# Patient Record
Sex: FEMALE | Race: WHITE | HISPANIC OR LATINO | Employment: OTHER | URBAN - METROPOLITAN AREA
[De-identification: names, ages, dates, MRNs, and addresses within clinical notes are randomized per-mention and may not be internally consistent; named-entity substitution may affect disease eponyms.]

---

## 2022-11-20 ENCOUNTER — HOSPITAL ENCOUNTER (EMERGENCY)
Facility: CLINIC | Age: 57
Discharge: HOME | End: 2022-11-20
Attending: EMERGENCY MEDICINE | Admitting: EMERGENCY MEDICINE
Payer: COMMERCIAL

## 2022-11-20 VITALS
SYSTOLIC BLOOD PRESSURE: 134 MMHG | RESPIRATION RATE: 18 BRPM | HEART RATE: 75 BPM | HEIGHT: 58 IN | WEIGHT: 110 LBS | OXYGEN SATURATION: 97 % | DIASTOLIC BLOOD PRESSURE: 65 MMHG | BODY MASS INDEX: 23.09 KG/M2 | TEMPERATURE: 98.2 F

## 2022-11-20 DIAGNOSIS — R73.9 HYPERGLYCEMIA: ICD-10-CM

## 2022-11-20 LAB
ANION GAP SERPL CALCULATED.3IONS-SCNC: 13 MMOL/L (ref 5–18)
BUN SERPL-MCNC: 11 MG/DL (ref 8–22)
CALCIUM SERPL-MCNC: 8.7 MG/DL (ref 8.5–10.5)
CHLORIDE BLD-SCNC: 105 MMOL/L (ref 98–107)
CO2 SERPL-SCNC: 18 MMOL/L (ref 22–31)
CREAT SERPL-MCNC: 0.68 MG/DL (ref 0.6–1.1)
ERYTHROCYTE [DISTWIDTH] IN BLOOD BY AUTOMATED COUNT: 12.3 % (ref 10–15)
GFR SERPL CREATININE-BSD FRML MDRD: >90 ML/MIN/1.73M2
GLUCOSE BLD-MCNC: 263 MG/DL (ref 70–125)
GLUCOSE BLDC GLUCOMTR-MCNC: 251 MG/DL (ref 70–99)
HCT VFR BLD AUTO: 38.8 % (ref 35–47)
HGB BLD-MCNC: 13.7 G/DL (ref 11.7–15.7)
MCH RBC QN AUTO: 32.2 PG (ref 26.5–33)
MCHC RBC AUTO-ENTMCNC: 35.3 G/DL (ref 31.5–36.5)
MCV RBC AUTO: 91 FL (ref 78–100)
PLATELET # BLD AUTO: 262 10E3/UL (ref 150–450)
POTASSIUM BLD-SCNC: 4.4 MMOL/L (ref 3.5–5)
RBC # BLD AUTO: 4.25 10E6/UL (ref 3.8–5.2)
SODIUM SERPL-SCNC: 136 MMOL/L (ref 136–145)
WBC # BLD AUTO: 6.1 10E3/UL (ref 4–11)

## 2022-11-20 PROCEDURE — 258N000003 HC RX IP 258 OP 636: Performed by: EMERGENCY MEDICINE

## 2022-11-20 PROCEDURE — 96361 HYDRATE IV INFUSION ADD-ON: CPT

## 2022-11-20 PROCEDURE — 99283 EMERGENCY DEPT VISIT LOW MDM: CPT | Mod: 25

## 2022-11-20 PROCEDURE — 80048 BASIC METABOLIC PNL TOTAL CA: CPT | Performed by: EMERGENCY MEDICINE

## 2022-11-20 PROCEDURE — 85018 HEMOGLOBIN: CPT | Performed by: EMERGENCY MEDICINE

## 2022-11-20 PROCEDURE — 36415 COLL VENOUS BLD VENIPUNCTURE: CPT | Performed by: EMERGENCY MEDICINE

## 2022-11-20 PROCEDURE — 250N000013 HC RX MED GY IP 250 OP 250 PS 637: Performed by: EMERGENCY MEDICINE

## 2022-11-20 PROCEDURE — 96360 HYDRATION IV INFUSION INIT: CPT

## 2022-11-20 RX ORDER — INSULIN GLARGINE 100 [IU]/ML
40 INJECTION, SOLUTION SUBCUTANEOUS 2 TIMES DAILY
Qty: 16 ML | Refills: 0 | Status: SHIPPED | OUTPATIENT
Start: 2022-11-20 | End: 2022-12-10

## 2022-11-20 RX ORDER — MECLIZINE HYDROCHLORIDE 25 MG/1
25 TABLET ORAL ONCE
Status: COMPLETED | OUTPATIENT
Start: 2022-11-20 | End: 2022-11-20

## 2022-11-20 RX ADMIN — SODIUM CHLORIDE 1000 ML: 9 INJECTION, SOLUTION INTRAVENOUS at 08:17

## 2022-11-20 RX ADMIN — MECLIZINE HYDROCHLORIDE 25 MG: 25 TABLET ORAL at 08:18

## 2022-11-20 ASSESSMENT — ACTIVITIES OF DAILY LIVING (ADL): ADLS_ACUITY_SCORE: 35

## 2022-11-20 NOTE — ED TRIAGE NOTES
Patient presents to the ED with complaints of nausea and dizziness for the last week. She reports she is having trouble getting her insulin and is working with her primary and insurance company. She is here from out of town on vacation. She has not had insulin for the last week.

## 2022-11-20 NOTE — DISCHARGE INSTRUCTIONS
Take the prescribed Lantus as directed and follow-up closely with your primary care doctor.  Return to the emergency department for any worsening symptoms or other concerns.

## 2022-11-20 NOTE — ED PROVIDER NOTES
EMERGENCY DEPARTMENT ENCOUnter      NAME: Khadra Perez  AGE: 57 year old female  YOB: 1965  MRN: [id number]  EVALUATION DATE & TIME: 2022  7:53 AM    PCP: No primary care provider on file.    ED PROVIDER: Kervin Parker DO      Chief Complaint   Patient presents with     Dizziness         FINAL IMPRESSION:  1. Hyperglycemia          ED COURSE & MEDICAL DECISION MAKIN:05 AM I met with the patient to gather history and perform an initial exam. PPE: gloves, N95 mask  9:22 AM I called lab for update on BMP.   10:08 AM I rechecked on the patient and updated them on results. We discussed plans for discharge including supportive cares, symptomatic treatment, outpatient follow up, and reasons to return to the emergency department.      The patient presents to the emergency department this morning with complaints of dizziness which has been present since she has been out of her insulin.  She has continued to watch her blood sugars closely.  She denies any other focal complaints.  Laboratory testing has been unremarkable today aside from a mild hyperglycemia.  She is feeling better after IV fluids and meclizine.  Plan will be for discharge home with a refill of her home dose of her insulin and instructions for outpatient follow-up.  She is comfortable with this plan.      Medical Decision Making    History:    Supplemental history from: N/A    External Record(s) reviewed: Documented in HPI, if applicable.    Work Up:    Chart documentation includes differential considered and any EKGs or imaging interpreted by provider.    In additional to work up documented, I considered the following work up: See chart documentation, if applicable.    External consultation:    Discussion of management with another provider: See chart documentation, if applicable    Complicating factors:    Care impacted by chronic illness: Diabetes    Care affected by social determinants of health: N/A    Disposition  considerations: Discharge. I prescribed additional prescription strength medication(s) as charted. I considered admission, but discharged patient after reassuring labs and/or imaging.      At the conclusion of the encounter I discussed the results of all of the tests and the disposition. The questions were answered. The patient or family acknowledged understanding and was agreeable with the care plan.         MEDICATIONS GIVEN IN THE EMERGENCY:  Medications   0.9% sodium chloride BOLUS (1,000 mLs Intravenous New Bag 11/20/22 0817)   meclizine (ANTIVERT) tablet 25 mg (25 mg Oral Given 11/20/22 0818)       NEW PRESCRIPTIONS STARTED AT TODAY'S ER VISIT  New Prescriptions    No medications on file          =================================================================    HPI        Khadra Perez is a 57 year old female with a pertinent history of diabetes mellitus who presents to this ED via car accompanied by her sister for evaluation of dizziness and hyperglycemia. The patient has been without her insulin for about three days. During this time, she developed room-spinning dizziness with associated nausea. She states she is unable to balance herself well when ambulating. She is normally fairly active at baseline, but she has been more fatigued over the last few days. Her morning blood sugars have been primarily around 70-90s and daytime blood sugars around 170-180s. However, this morning, her blood sugar was in the 250s. Patient is visiting from [place], [place], and she's had difficulty getting her insulin while she is here. She has not been eating much over the last few days as a result and has primarily been eating protein shakes. She also reports feels anxious over her situation with her insulin and leaving [place]. She reports she is otherwise healthy and denies any known medication allergies. No other reported complaints or concerns at this time.       REVIEW OF SYSTEMS     Constitutional:  Denies fever or  "chills. Positive for hyperglycemia and fatigue  HENT:  Denies sore throat   Respiratory:  Denies cough or shortness of breath   Cardiovascular:  Denies chest pain or palpitations  GI:  Denies abdominal pain or vomiting. Positive for nausea.   Musculoskeletal:  Denies any new extremity pain   Skin:  Denies rash   Neurologic:  Denies headache, focal weakness or sensory changes. Positive for dizziness.   Psych: Positive for anxiety.  All other systems reviewed and are negative      PAST MEDICAL HISTORY:  History reviewed. No pertinent past medical history.    PAST SURGICAL HISTORY:  History reviewed. No pertinent surgical history.        CURRENT MEDICATIONS:    No current outpatient medications on file.      ALLERGIES:  No Known Allergies    FAMILY HISTORY:  History reviewed. No pertinent family history.    SOCIAL HISTORY:   Social History     Socioeconomic History     Marital status: Single     Spouse name: None     Number of children: None     Years of education: None     Highest education level: None   Social History Narrative    Nov 2022: Patient is from [place], [place]. Patient is visiting her sister who lives in [place]. She is retired and unemployed.        VITALS:  Patient Vitals for the past 24 hrs:   BP Temp Temp src Pulse Resp SpO2 Height Weight   11/20/22 0747 (!) 198/96 98.2  F (36.8  C) Oral 87 18 97 % 1.473 m (4' 10\") 49.9 kg (110 lb)       PHYSICAL EXAM    Constitutional:  Well developed, Well nourished,  HENT:  Normocephalic, Atraumatic, Bilateral external ears normal, Oropharynx moist, Nose normal.   Neck:  Normal range of motion, No meningismus, No stridor.   Eyes:  EOMI, Conjunctiva normal, No discharge.   Respiratory:  Normal breath sounds, No respiratory distress, No wheezing  Cardiovascular:  Normal heart rate, Normal rhythm, No murmurs  GI:  Soft, No tenderness, No guarding  Musculoskeletal:   No tenderness to palpation or major deformities noted.   Integument:  Warm, Dry, No erythema, No rash. "   Neurologic:  Alert & oriented x 3, Normal motor function, Normal sensory function, No focal deficits noted.   Psychiatric:  Affect normal, Judgment normal, Mood normal.      LAB:  All pertinent labs reviewed and interpreted.  Results for orders placed or performed during the hospital encounter of 11/20/22   Glucose by meter   Result Value Ref Range    GLUCOSE BY METER POCT 251 (H) 70 - 99 mg/dL   CBC with platelets   Result Value Ref Range    WBC Count 6.1 4.0 - 11.0 10e3/uL    RBC Count 4.25 3.80 - 5.20 10e6/uL    Hemoglobin 13.7 11.7 - 15.7 g/dL    Hematocrit 38.8 35.0 - 47.0 %    MCV 91 78 - 100 fL    MCH 32.2 26.5 - 33.0 pg    MCHC 35.3 31.5 - 36.5 g/dL    RDW 12.3 10.0 - 15.0 %    Platelet Count 262 150 - 450 10e3/uL   Basic metabolic panel   Result Value Ref Range    Sodium 136 136 - 145 mmol/L    Potassium 4.4 3.5 - 5.0 mmol/L    Chloride 105 98 - 107 mmol/L    Carbon Dioxide (CO2) 18 (L) 22 - 31 mmol/L    Anion Gap 13 5 - 18 mmol/L    Urea Nitrogen 11 8 - 22 mg/dL    Creatinine 0.68 0.60 - 1.10 mg/dL    Calcium 8.7 8.5 - 10.5 mg/dL    Glucose 263 (H) 70 - 125 mg/dL    GFR Estimate >90 >60 mL/min/1.73m2         I, Carrie Starkey, am serving as a scribe to document services personally performed by Dr. Parker based on my observation and the provider's statements to me. IKervin, DO attest that Carrie Starkey is acting in a scribe capacity, has observed my performance of the services and has documented them in accordance with my direction.    Kervin Parker DO  Emergency Medicine  [facility] EMERGENCY ROOM  [address] [id number] [phone]  Dept: [phone]       Kervin Parker MD  11/20/22 3598